# Patient Record
Sex: FEMALE | Race: WHITE | NOT HISPANIC OR LATINO | Employment: OTHER | URBAN - METROPOLITAN AREA
[De-identification: names, ages, dates, MRNs, and addresses within clinical notes are randomized per-mention and may not be internally consistent; named-entity substitution may affect disease eponyms.]

---

## 2022-03-02 ENCOUNTER — NURSE TRIAGE (OUTPATIENT)
Dept: ADMINISTRATIVE | Facility: CLINIC | Age: 67
End: 2022-03-02

## 2022-03-02 NOTE — TELEPHONE ENCOUNTER
Non-[facility] patient calling because she saw her PCP 8-9 days ago and was diagnosed with COVID, but was told there was nothing they could prescribe to her for COVID.  Her S.O. was also dx with COVID and went to the ED at [facility],where they gave him the monoclonal antibody infusion and he recovered/improved almost immediately.  She is asking how she can get the monoclonal antibody infusion.  I advised a physician order is necessary, and she must meet the criteria to receive the infusion, recommended she go in to [facility] in [place] and let them evaluate her and they could write the order if she qualified for the infusion,  advised she could be seen in the ED, also.  She verbalized understanding, will go to the [facility] for evaluation.  Reason for Disposition   Caller wants to use a complementary or alternative medicine    Additional Information   Negative: Intentional drug overdose and suicidal thoughts or ideas   Negative: Drug overdose and triager unable to answer question   Negative: Caller requesting information unrelated to medicine   Negative: Caller requesting information about COVID-19 Vaccine   Negative: Caller requesting information about Emergency Contraception   Negative: Caller requesting information about Combined Birth Control Pills   Negative: Caller requesting information about Progestin Birth Control Pills   Negative: Caller requesting information about Post-Op pain or medicines   Negative: Caller requesting a prescription antibiotic (such as penicillin) for Strep throat and has a positive culture result   Negative: Caller requesting a prescription anti-viral med (such as Tamiflu) and has influenza (flu) symptoms   Negative: Immunization reaction suspected   Negative: Rash while taking a medicine or within 3 days of stopping it   Negative: Asthma and having symptoms of asthma (cough, wheezing, etc.)   Negative: Symptom of illness (e.g., headache, abdominal pain, earache, vomiting) that  are more than mild   Negative: Breastfeeding questions about mother's medicines and diet   Negative: MORE THAN A DOUBLE DOSE of a prescription or over-the-counter (OTC) drug   Negative: DOUBLE DOSE (an extra dose or lesser amount) of prescription drug and any symptoms (e.g., dizziness, nausea, pain, sleepiness)   Negative: DOUBLE DOSE (an extra dose or lesser amount) of over-the-counter (OTC) drug and any symptoms (e.g., dizziness, nausea, pain, sleepiness)   Negative: Took another person's prescription drug   Negative: DOUBLE DOSE (an extra dose or lesser amount) of prescription drug and NO symptoms (Exception: a double dose of antibiotics)   Negative: Diabetes drug error or overdose (e.g., took wrong type of insulin or took extra dose)   Negative: Caller has medication question about med not prescribed by PCP and triager unable to answer question (e.g., compatibility with other med, storage)   Negative: Prescription refill request for ESSENTIAL medicine (i.e., likelihood of harm to patient if not taken) and triager unable to refill per department policy   Negative: Prescription not at pharmacy and was prescribed by PCP recently (Exception: triager has access to EMR and prescription is recorded there. Go to Home Care and confirm for pharmacy.)   Negative: Pharmacy calling with prescription question and triager unable to answer question   Negative: Caller has URGENT medicine question about med that PCP or specialist prescribed and triager unable to answer question   Negative: Prescription refill request for a CONTROLLED substance (e.g., narcotics, ADHD medicines)   Negative: Prescription refill request for NON-ESSENTIAL medicine (i.e., no harm to patient if med not taken) and triager unable to refill per department policy   Negative: Caller has NON-URGENT medicine question about med that PCP or specialist prescribed and triager unable to answer question    Protocols used: MEDICATION QUESTION  CALL-A-[place]

## 2023-05-05 ENCOUNTER — OFFICE VISIT (OUTPATIENT)
Dept: URGENT CARE | Facility: CLINIC | Age: 68
End: 2023-05-05
Payer: MEDICARE

## 2023-05-05 VITALS
OXYGEN SATURATION: 95 % | WEIGHT: 200 LBS | DIASTOLIC BLOOD PRESSURE: 94 MMHG | BODY MASS INDEX: 31.39 KG/M2 | RESPIRATION RATE: 20 BRPM | SYSTOLIC BLOOD PRESSURE: 137 MMHG | HEIGHT: 67 IN | HEART RATE: 99 BPM | TEMPERATURE: 97 F

## 2023-05-05 DIAGNOSIS — R14.0 ABDOMINAL BLOATING: ICD-10-CM

## 2023-05-05 DIAGNOSIS — A08.4 VIRAL GASTROENTERITIS: Primary | ICD-10-CM

## 2023-05-05 PROCEDURE — 99203 PR OFFICE/OUTPT VISIT, NEW, LEVL III, 30-44 MIN: ICD-10-PCS | Mod: S$GLB,,,

## 2023-05-05 PROCEDURE — 99203 OFFICE O/P NEW LOW 30 MIN: CPT | Mod: S$GLB,,,

## 2023-05-05 RX ORDER — ZOLPIDEM TARTRATE 10 MG/1
10 TABLET ORAL NIGHTLY PRN
COMMUNITY
Start: 2023-04-10

## 2023-05-05 RX ORDER — SIMETHICONE 125 MG
125 CAPSULE ORAL 4 TIMES DAILY PRN
Qty: 28 EACH | Refills: 0 | Status: SHIPPED | OUTPATIENT
Start: 2023-05-05 | End: 2023-05-12

## 2023-05-05 RX ORDER — ONDANSETRON 4 MG/1
4 TABLET, ORALLY DISINTEGRATING ORAL EVERY 8 HOURS PRN
Qty: 10 TABLET | Refills: 0 | Status: SHIPPED | OUTPATIENT
Start: 2023-05-05 | End: 2023-05-08

## 2023-05-05 RX ORDER — ESTRADIOL 1 MG/1
1 TABLET ORAL
COMMUNITY
Start: 2023-05-05

## 2023-05-05 RX ORDER — DIAZEPAM 5 MG/1
5 TABLET ORAL DAILY PRN
COMMUNITY
Start: 2023-05-05

## 2023-05-05 RX ORDER — BUSPIRONE HYDROCHLORIDE 10 MG/1
10 TABLET ORAL 2 TIMES DAILY
COMMUNITY
Start: 2023-05-05

## 2023-05-05 RX ORDER — LISINOPRIL 40 MG/1
40 TABLET ORAL
COMMUNITY
Start: 2023-04-05

## 2023-05-05 NOTE — PATIENT INSTRUCTIONS
"Mild vomiting and nausea may last about 3 days. Water or ice chips are best to give. Pedialyte or gatorade.  1 tablespoon every 15 minutes. After 1 hours without vomiting, increase the amount, after 4 hours without vomiting, return to normal fluids. After 8 hours without vomiting  add solids. Limit to bland foods, starchy foods are easiest to digest.. Start with crackers, bread, cereal, rice, mashed potatoes, noodles, etc. Return to normal diet in 24-48 hours.  Call if blood in emesis,severe abdominal pain, lethargy,signs of dehydration(poor urine out/no tears),ill appearing/signs of meningitis(neck stiff or light bothering eyes) or symptoms persist more than 2 days without improvement      Use gatorade/pedialyte/coconut water or rehydration packets to help stay hydrated. Vitamin water and plain water do not contain rehydrating electrolytes.  Increase clear liquids (water, gatorade, pedialyte, broths, jello, etc) Hold off on solids for 12-18 hours. Then advance to BRAT diet (banana, rice, applesauce, tea, toast/crackers), then advance further as tolerated.    Use Peptobismol to help alleviate your diarrhea symptoms. Avoid immodium.     See your doctor or nurse if:  1. You have more than 6 runny bowel movements in 24 hours  2. You have blood in your bowel movements   3. You have a fever higher than 101.3ºF (38.5ºC) that does not go away after a day  4. You have severe belly pain  5. Your body has lost too much water. This is called "dehydration." Signs include:   o Lots of diarrhea that is very watery   o Feeling very tired   o Thirst   o Dry mouth or tongue   o Muscle cramps   o Dizziness   o Confusion   o Urine that is very yellow, or not needing to urinate for more than 5 hours  - Eat small meals more often during the day.  - Avoid large meals.  - Stay away from spicy or seasoned foods or other foods that should be avoided.  - Stay away from fatty foods, like fried foods and high-fat dairy products.  - Eat slowly " and chew your food carefully.  - Drink fluids slowly.  - Do not eat for at least 2 hours before going to bed.    Eat a bland diet

## 2023-05-05 NOTE — PROGRESS NOTES
"Subjective:      Patient ID: Roxanna Benitez is a 67 y.o. female.    Vitals:  height is 5' 7" (1.702 m) and weight is 90.7 kg (200 lb). Her oral temperature is 97.3 °F (36.3 °C). Her blood pressure is 137/94 (abnormal) and her pulse is 99. Her respiration is 20 and oxygen saturation is 95%.     Chief Complaint: Abdominal Pain    Patient states she is having a stomach ache for a week now . She also states it is very painful . She also states every time she eats she has to run to the bathroom .     Abdominal Pain  This is a new problem. The current episode started 1 to 4 weeks ago. The problem occurs rarely. The problem has been gradually worsening. The pain is at a severity of 8/10. The pain is severe. The abdominal pain does not radiate. Associated symptoms include diarrhea and nausea. Nothing aggravates the pain. The pain is relieved by Nothing. Treatments tried: Pepto. The treatment provided no relief.     Gastrointestinal:  Positive for abdominal pain, nausea and diarrhea.    Objective:     Physical Exam    Assessment:     1. Viral gastroenteritis    2. Abdominal bloating        Plan:       Viral gastroenteritis  -     simethicone (GAS RELIEF, SIMETHICONE,) 125 mg Cap capsule; Take 1 capsule (125 mg total) by mouth 4 (four) times daily as needed for Flatulence.  Dispense: 28 each; Refill: 0  -     ondansetron (ZOFRAN-ODT) 4 MG TbDL; Take 1 tablet (4 mg total) by mouth every 8 (eight) hours as needed (nausea).  Dispense: 10 tablet; Refill: 0    Abdominal bloating  -     simethicone (GAS RELIEF, SIMETHICONE,) 125 mg Cap capsule; Take 1 capsule (125 mg total) by mouth 4 (four) times daily as needed for Flatulence.  Dispense: 28 each; Refill: 0      Pt is scheduled to follow up with gastroenterologist on Wed May 10, 2023 at [facility].  Encouraged her to keep this appointment.      The patient has diarrhea 3 times per day for 3 days, without vomiting but has had nausea daily.  No severe abdominal pain, high fevers or " blood in stool. These symptoms are consistent with viral gastroenteritis. I have recommended gastroenteritis advice of small amounts of clear liquids such as soups, broth, juices, water, pedialyte or gatorade.  Advance diet as tolerated.  Advised to call back if worsening symptoms such as more than 6 stools per day, not voiding regularly, unable to take oral fluids, high fever, severe weakness or fainting, dry mucous membranes or other signs of dehydration, persisting or increasing abdominal pain, blood in stool or vomit, or failure to improve in 1-2 days.     Discussed plan of care with patient.  They voiced full understanding and are in agreement with the current plan of care.  All known questions and concerns addressed at this time.      You must understand that you have received treatment at an Urgent Care Facility only, and that you may be released before all of your medical problems are known or treated.  Urgent Care facilities are not equipped to handle life threatening emergencies.  It is recommended that you seek care at an Emergency Department IF further evaluation or worsening or concerning symptoms occur, or possibly life threatening conditions as discussed.